# Patient Record
Sex: FEMALE | Race: WHITE | Employment: FULL TIME | URBAN - METROPOLITAN AREA
[De-identification: names, ages, dates, MRNs, and addresses within clinical notes are randomized per-mention and may not be internally consistent; named-entity substitution may affect disease eponyms.]

---

## 2017-02-14 ENCOUNTER — TELEPHONE (OUTPATIENT)
Dept: NURSING | Facility: CLINIC | Age: 57
End: 2017-02-14

## 2017-02-15 NOTE — TELEPHONE ENCOUNTER
Call Type: Triage Call    Presenting Problem: Pt states waking up this morning feeling itchy  and broke out in welts or hives, pt took Benadryl upon onset, that  med helped the itching and repeated med at 6p due to returning  itching and spread of hives.Pt completed course of antibiotic Keflex  on 2/11/17 for urinary tract infection and started topical Premarin  cream on 2/9/17-last dose of this was 2/13/17.  Triage Note:  Guideline Title: Hives  Recommended Disposition: See Provider within 24 hours  Original Inclination: Wanted to speak with a nurse  Override Disposition:  Intended Action: Follow advice given  Physician Contacted: No  Episode of hives not improving within 8 hours of home care AND not previously  evaluated ?  YES  Severe breathing problems ? NO  Breathing problems ? NO  New or worsening signs and symptoms that may indicate shock ? NO  Sudden change in mental status ? NO  Signs/symptoms of anaphylaxis ? NO  First episode of hives with no other symptoms occurring within minutes to several  hours after exposure to an allergen ? NO  New onset of hives after beginning new prescribed, nonprescribed, or  alternative/complementary medication ? NO  History of severe reaction after previous similar exposure to known allergen ? NO  Localized or widespread rash that does not have the appearance of hives (itchy  welts or wheals) ? NO  Physician Instructions:  Care Advice: Call provider if symptoms worsen or new symptoms develop.  Wear loose-fitting, non-restricting clothing.  Cool/tepid showers or baths may help relieve itching.  If cool water alone  does not relieve itching, try adding 1/2 to 1 cup baking soda or colloidal  oatmeal (Aveeno) to bath water.  CAUTIONS  SYMPTOM / CONDITION MANAGEMENT  List, or take, all current prescription(s), nonprescription or alternative  medication(s) to provider for evaluation.  Call  if develop signs and symptoms of anaphylaxis within minutes to  several hours of  exposure: severe difficulty breathing  rapid, weak or irregular pulse  pruritus, urticaria, swelling of face, lips, tongue, or throat causing  tightness or difficulty swallowing  abdominal cramping, nausea, vomiting or diarrhea.  For symptom relief, consider nonprescription antihistamines (such as  Allerest, Allegra, Claritin, Zyrtec, Chlor-Trimetron, Benadryl, etc.) as  directed on label or by pharmacist. Drowsiness may result, especially in  geriatric patients. Non-sedating antihistamines are available without a  prescription.

## 2018-06-17 ENCOUNTER — HOSPITAL ENCOUNTER (EMERGENCY)
Facility: CLINIC | Age: 58
Discharge: HOME | End: 2018-06-17
Attending: EMERGENCY MEDICINE | Admitting: EMERGENCY MEDICINE
Payer: COMMERCIAL

## 2018-06-17 VITALS
HEIGHT: 66 IN | HEART RATE: 81 BPM | TEMPERATURE: 98.7 F | SYSTOLIC BLOOD PRESSURE: 113 MMHG | WEIGHT: 148 LBS | DIASTOLIC BLOOD PRESSURE: 79 MMHG | BODY MASS INDEX: 23.78 KG/M2 | RESPIRATION RATE: 18 BRPM | OXYGEN SATURATION: 98 %

## 2018-06-17 DIAGNOSIS — R05.9 COUGH: ICD-10-CM

## 2018-06-17 DIAGNOSIS — T78.40XA ALLERGIC REACTION, INITIAL ENCOUNTER: ICD-10-CM

## 2018-06-17 PROCEDURE — 25000132 ZZH RX MED GY IP 250 OP 250 PS 637: Performed by: EMERGENCY MEDICINE

## 2018-06-17 PROCEDURE — 99283 EMERGENCY DEPT VISIT LOW MDM: CPT

## 2018-06-17 RX ORDER — EPINEPHRINE 0.3 MG/.3ML
0.3 INJECTION SUBCUTANEOUS
Qty: 0.6 ML | Refills: 0 | Status: SHIPPED | OUTPATIENT
Start: 2018-06-17

## 2018-06-17 RX ORDER — DIPHENHYDRAMINE HCL 25 MG
50 CAPSULE ORAL ONCE
Status: COMPLETED | OUTPATIENT
Start: 2018-06-17 | End: 2018-06-17

## 2018-06-17 RX ADMIN — DIPHENHYDRAMINE HYDROCHLORIDE 50 MG: 25 CAPSULE ORAL at 10:21

## 2018-06-17 ASSESSMENT — ENCOUNTER SYMPTOMS
VOMITING: 0
VOICE CHANGE: 0
FACIAL SWELLING: 0
DIARRHEA: 0
NAUSEA: 0
COUGH: 1

## 2018-06-17 NOTE — ED AVS SNAPSHOT
[facility] Emergency Department    [address] [place] [address]    Phone:  [phone]    Fax:  [phone]                                       Katelin Krueger   MRN: [id number]    Department:  [facility] Emergency Department   Date of Visit:  6/17/2018           Patient Information     Date Of Birth          1960        Your diagnoses for this visit were:     Allergic reaction, initial encounter     Cough        You were seen by Marcelle Quinones MD.      Follow-up Information     Follow up with System, Provider Not In. Schedule an appointment as soon as possible for a visit in 1 day.    Specialty:  Clinic    Why:  for reheck    Contact information:               Discharge Instructions       Please take prednisone and benadryl for allergies and itching.  Return to the ED if worsening swelling, shortness of breath, throat itching/difficulty swallowing, worsening rash or other acute changes.  Watch for these signs.  Give yourself an epi pen if you have shortness of breath, throat tightness, significant lip swelling and call 911.      Discharge Instructions  Allergic Reaction    An allergic reaction can result in a rash, itching, swelling, watery eyes, or a runny nose. A serious reaction can cause swelling of your mouth or throat, or difficulty breathing (wheezing). The most serious allergy is called anaphylaxis, and can be life-threatening. Many allergies result in hives, also called urticaria.       An allergy happens when the body s natural defense system (immune system) overreacts to something. The thing that triggers your allergic reaction is called an allergen. The first time you are exposed to your allergen, you may not have any reaction, but the body makes a protein called an antibody. The antibody lets the body recognize and remember the allergen.  Every time you are exposed to your allergen you get more antibody and your reaction can be more severe.       Generally, every Emergency Department visit should have a follow-up clinic visit with either a primary or a specialty clinic/provider. Please follow-up as instructed by your emergency provider today.    Call 911 if you have:    Swelling of the lips, tongue or throat.    Hoarse voice, drooling or trouble breathing.    Chest pain or shortness of breath.    Fainting or unconsciousness.    What can I do to help myself?    If you know what caused your allergy, do not touch it, throw any of it away, and tell others not to have it around you. Wear a medical alert bracelet with a name of your allergen on it.    If you do not know what you are allergic to, keep a journal of everything that you are exposed to (foods, soaps, medicines, etc.). Take this with you when you follow up with your primary provider or specialist (Allergist). This may help determine what is causing the allergic reaction.    Take any medicines that are prescribed.    Antihistamines can decrease rash or itching. You may use Benadryl  (diphenhydramine) for rash or itching according to package directions, or use a prescription antihistamine as recommended by your provider.    For significant allergic reactions, you may have been given a prescription for an epinephrine (adrenaline) auto injector. Carry this with you at all times! Use it if you are having any symptoms of anaphylaxis.  Do not be afraid to use it. Return to the Emergency Department if you use your auto injector, call 911 if it does not resolve the symptoms. It is only meant to buy time until you can get to the Emergency Department!  If you were given a prescription for medicine here today, be sure to read all of the information (including the package insert) that comes with your prescription.  This will include important information about the medicine, its side effects, and any warnings that you need to know about.  The pharmacist who fills the prescription can provide more information and  answer questions you may have about the medicine.  If you have questions or concerns that the pharmacist cannot address, please call or return to the Emergency Department.   Remember that you can always come back to the Emergency Department if you are not able to see your regular provider in the amount of time listed above, if you get any new symptoms, or if there is anything that worries you.      24 Hour Appointment Hotline       To make an appointment at any [facility], call [phone] ([phone]). If you don't have a family doctor or clinic, we will help you find one. [facility] are conveniently located to serve the needs of you and your family.             Review of your medicines      START taking        Dose / Directions Last dose taken    EPINEPHrine 0.3 MG/0.3ML injection 2-pack   Commonly known as:  EPIPEN/ADRENACLICK/or ANY BX GENERIC EQUIV   Dose:  0.3 mg   Quantity:  0.6 mL        Inject 0.3 mLs (0.3 mg) into the muscle once as needed   Refills:  0                Prescriptions were sent or printed at these locations (1 Prescription)                   Other Prescriptions                Printed at Department/Unit printer (1 of 1)         EPINEPHrine (EPIPEN/ADRENACLICK/OR ANY BX GENERIC EQUIV) 0.3 MG/0.3ML injection 2-pack                Orders Needing Specimen Collection     None      Pending Results     No orders found from 6/15/2018 to 6/18/2018.            Pending Culture Results     No orders found from 6/15/2018 to 6/18/2018.            Pending Results Instructions     If you had any lab results that were not finalized at the time of your Discharge, you can call the ED Lab Result RN at [phone]. You will be contacted by this team for any positive Lab results or changes in treatment. The nurses are available 7 days a week from 10A to 6:30P.  You can leave a message 24 hours per day and they will return your call.        Test Results From Your Hospital Stay                Clinical Quality Measure: Blood Pressure Screening     Your blood pressure was checked while you were in the emergency department today. The last reading we obtained was  BP: 113/79 . Please read the guidelines below about what these numbers mean and what you should do about them.  If your systolic blood pressure (the top number) is less than 120 and your diastolic blood pressure (the bottom number) is less than 80, then your blood pressure is normal. There is nothing more that you need to do about it.  If your systolic blood pressure (the top number) is 120-139 or your diastolic blood pressure (the bottom number) is 80-89, your blood pressure may be higher than it should be. You should have your blood pressure rechecked within a year by a primary care provider.  If your systolic blood pressure (the top number) is 140 or greater or your diastolic blood pressure (the bottom number) is 90 or greater, you may have high blood pressure. High blood pressure is treatable, but if left untreated over time it can put you at risk for heart attack, stroke, or kidney failure. You should have your blood pressure rechecked by a primary care provider within the next 4 weeks.  If your provider in the emergency department today gave you specific instructions to follow-up with your doctor or provider even sooner than that, you should follow that instruction and not wait for up to 4 weeks for your follow-up visit.        Thank you for choosing [facility]       Thank you for choosing [facility] for your care. Our goal is always to provide you with excellent care. Hearing back from our patients is one way we can continue to improve our services. Please take a few minutes to complete the written survey that you may receive in the mail after you visit with us. Thank you!        [facility] Information     [facility] lets you send messages to your doctor, view your test results, renew your prescriptions, schedule appointments and more. To sign up,  "go to [email or web address] . Click on \"Log in\" on the left side of the screen, which will take you to the Welcome page. Then click on \"Sign up Now\" on the right side of the page.     You will be asked to enter the access code listed below, as well as some personal information. Please follow the directions to create your username and password.     Your access code is: [email or web address]  Expires: 9/15/2018 11:12 AM     Your access code will  in 90 days. If you need help or a new code, please call your [facility] clinic or [phone].        Care EveryWhere ID     This is your Care EveryWhere ID. This could be used by other organizations to access your [facility] medical records  [id number]        Equal Access to Services     CRESCENCIO MORAN : Jacy Prescott, tresa diego, tyrone ogalgenevieve vega, capri bryan . So [facility] [phone].    ATENCIÓN: Si habla español, tiene a garcia disposición servicios gratuitos de asistencia lingüística. Maurilio al [phone].    We comply with applicable federal civil rights laws and [place] laws. We do not discriminate on the basis of race, color, national origin, age, disability, sex, sexual orientation, or gender identity.            After Visit Summary       This is your record. Keep this with you and show to your community pharmacist(s) and doctor(s) at your next visit.                  "

## 2018-06-17 NOTE — DISCHARGE INSTRUCTIONS
Please take prednisone and benadryl for allergies and itching.  Return to the ED if worsening swelling, shortness of breath, throat itching/difficulty swallowing, worsening rash or other acute changes.  Watch for these signs.  Give yourself an epi pen if you have shortness of breath, throat tightness, significant lip swelling and call 911.      Discharge Instructions  Allergic Reaction    An allergic reaction can result in a rash, itching, swelling, watery eyes, or a runny nose. A serious reaction can cause swelling of your mouth or throat, or difficulty breathing (wheezing). The most serious allergy is called anaphylaxis, and can be life-threatening. Many allergies result in hives, also called urticaria.       An allergy happens when the body s natural defense system (immune system) overreacts to something. The thing that triggers your allergic reaction is called an allergen. The first time you are exposed to your allergen, you may not have any reaction, but the body makes a protein called an antibody. The antibody lets the body recognize and remember the allergen.  Every time you are exposed to your allergen you get more antibody and your reaction can be more severe.      Generally, every Emergency Department visit should have a follow-up clinic visit with either a primary or a specialty clinic/provider. Please follow-up as instructed by your emergency provider today.    Call 911 if you have:    Swelling of the lips, tongue or throat.    Hoarse voice, drooling or trouble breathing.    Chest pain or shortness of breath.    Fainting or unconsciousness.    What can I do to help myself?    If you know what caused your allergy, do not touch it, throw any of it away, and tell others not to have it around you. Wear a medical alert bracelet with a name of your allergen on it.    If you do not know what you are allergic to, keep a journal of everything that you are exposed to (foods, soaps, medicines, etc.). Take this with  you when you follow up with your primary provider or specialist (Allergist). This may help determine what is causing the allergic reaction.    Take any medicines that are prescribed.    Antihistamines can decrease rash or itching. You may use Benadryl  (diphenhydramine) for rash or itching according to package directions, or use a prescription antihistamine as recommended by your provider.    For significant allergic reactions, you may have been given a prescription for an epinephrine (adrenaline) auto injector. Carry this with you at all times! Use it if you are having any symptoms of anaphylaxis.  Do not be afraid to use it. Return to the Emergency Department if you use your auto injector, call 911 if it does not resolve the symptoms. It is only meant to buy time until you can get to the Emergency Department!  If you were given a prescription for medicine here today, be sure to read all of the information (including the package insert) that comes with your prescription.  This will include important information about the medicine, its side effects, and any warnings that you need to know about.  The pharmacist who fills the prescription can provide more information and answer questions you may have about the medicine.  If you have questions or concerns that the pharmacist cannot address, please call or return to the Emergency Department.   Remember that you can always come back to the Emergency Department if you are not able to see your regular provider in the amount of time listed above, if you get any new symptoms, or if there is anything that worries you.

## 2018-06-17 NOTE — ED AVS SNAPSHOT
[facility] Emergency Department    [address]    [facility] [address]    Phone:  [phone]    Fax:  [phone]                                       Katelin Krueger   MRN: [id number]    Department:  [facility] Emergency Department   Date of Visit:  6/17/2018           After Visit Summary Signature Page     I have received my discharge instructions, and my questions have been answered. I have discussed any challenges I see with this plan with the nurse or doctor.    ..........................................................................................................................................  Patient/Patient Representative Signature      ..........................................................................................................................................  Patient Representative Print Name and Relationship to Patient    ..................................................               ................................................  Date                                            Time    ..........................................................................................................................................  Reviewed by Signature/Title    ...................................................              ..............................................  Date                                                            Time

## 2018-06-17 NOTE — ED TRIAGE NOTES
"Treated for UTI with bactrim, had an allergic reaction. Changed antibiotic on Friday. Got prednisone from PMD to take in allergy symptoms continue.  Today pt c/o \"tingling in tongue and raw feeling under tongue\"  "

## 2018-06-17 NOTE — ED PROVIDER NOTES
"  History     Chief Complaint:  Allergic Reaction    The history is provided by the patient.      Katelin Krueger is a 57 year old female who presents with her  for evaluation of an allergic reaction. The patient reports that she was evaluated in clinic 3 days ago for a UTI, prescribed Bactrim which she has taken before with no reaction, and discharged home. She took her first dose of Bactrim that day. Two days ago, the patient noticed tongue and throat swelling when she woke up, prompting her to take Benadryl which alleviated her symptoms before again seeking evaluation in clinic. Patient was sent home with Prednisone and Zyrtec at that point. Her most recent dose of benadryl was that night (2 nights ago).  Her antibiotics were changed to cefuroxime which she has tolerated before.      This morning, the patient endorses noticing a \"raw feeling\" under he tongue along with mouth tingling and coughing; no mouth swelling at this time. Her tongue is described as \"the feeling after you burn your tongue on hot coffee\". Around 9am while walking into [employer], the patient additionally noticed a \"funny feeling\" in her throat described as a tickle. Patient was advised to present to the ED if she develops a cough, prompting her evaluation today. Patient has taken 4 doses of cefuroxime prior to presentation. Patient does not have an EpiPen at home. Patient denies noticing any ulcers or rashes, any recent food, clothing, or detergent changes, nausea, vomiting, diarrhea, voice changes, drooling, or other complaint.     Allergies:  Asa [Aspirin]  Bactrim [Sulfamethoxazole W/Trimethoprim]  Ibuprofen  Nitrofurantoin  Quinolones      Medications:    Metoprolol  Multivitamin    Past Medical History:    Allergic reaction    Past Surgical History:    History reviewed. No pertinent surgical history.     Family History:    History reviewed. No pertinent family history.      Social History:  Presents with spouse   Tobacco use: not on " "file  Alcohol use: not on file  PCP: Arlyn Page    Marital Status:       Review of Systems   HENT: Negative for drooling, facial swelling and voice change.    Respiratory: Positive for cough.    Gastrointestinal: Negative for diarrhea, nausea and vomiting.   Skin: Negative for rash.   All other systems reviewed and are negative.    Physical Exam     Patient Vitals for the past 24 hrs:   BP Temp Temp src Pulse Resp SpO2 Height Weight   06/17/18 1045 - - - - - 98 % - -   06/17/18 1021 113/79 98.7  F (37.1  C) Oral 81 18 96 % 1.664 m (5' 5.5\") 67.1 kg (148 lb)   06/17/18 1015 - - - - - 94 % - -        Physical Exam  Physical Exam   General: Resting on the bed.  Head: No obvious trauma to head.  Ears, Nose, Throat:  External ears normal.  Nose normal.  No pharyngeal erythema, swelling or exudate.  Midline uvula.  no swelling or lesions.    Eyes:  Conjunctivae clear.  Pupils are equal, round, and reactive.   Neck: Normal range of motion.  Neck supple.   CV: Regular rate and rhythm.  No murmurs.      Respiratory: Effort normal and breath sounds normal.  No wheezing or crackles.   Gastrointestinal: Soft.  No distension. There is no tenderness.   Neuro: Alert. Moving all extremities appropriately.  Normal speech.    Skin: Skin is warm and dry.  No rash or hives noted. No blisters.      Emergency Department Course     Interventions:  1021: Benadryl 50 mg IV      Emergency Department Course:  Past medical records, nursing notes, and vitals reviewed.  1010: I performed an exam of the patient and obtained history, as documented above.    Above interventions provided.    1111: I rechecked the patient. Patient is feeling much better. Findings and plan explained to the Patient and spouse. Patient discharged home with instructions regarding supportive care, medications, and reasons to return. The importance of close follow-up was reviewed.       Impression & Plan      Medical Decision Making:  Katelin Krueger is a 57 " year old female with several allergic reactions to medication presents with concern for allergic reaction.  Vital signs are unremarkable.  Broad differential pursued, including anaphylaxis, angioedema, localized allergic reaction, drug reaction, Rober Josep, etc.  Overall, the patient has very minimal symptoms and some vague tingling in her tongue and a tickle in the back of her throat.  She reports some coughing.  She is given return precautions and if she starts to cough and then to start prednisone.  This morning she was concerned about this and presented to the ER.  She did take 20 mg of prednisone in addition to 10 mg of Zyrtec.  She has no changes in voice, no muffled voice, no swelling of the lips or tongue, no significant angioedema.  She has clear lung sounds to auscultation, no evidence of wheezing.  No nausea or vomiting.  No rash.  At this point she does not meet anaphylaxis criteria.  There is no rash to suggest drug eruption, Kim-Josep or other similar dangerous medication induced reactions.  She is given 50 mg of Benadryl and observed for a period of time.  She felt improved.  Advised that she continue prednisone and Zyrtec.  She will take Benadryl as needed.  Advised that she see her primary care doctor tomorrow for a recheck.  I suspect this is likely a delayed reaction or a continued reaction from the Bactrim, but did discuss with patient that it could be secondary to the cefuroxime.  She has tolerated this medication in the past without issues.  This seems to be less likely, but is still possible.  Given that she has UTI that they are attempting to treat, I advised that she continue this antibiotic unless she has a further reaction to it.  She has had numerous medical reactions therefore I would like to avoid changing her medication in case there is a subsequent reaction.  Patient agrees to this plan.  She was discharged with an EpiPen as needed for severe reactions.  She voiced  understanding of the plan, and was discharged in stable condition.    Diagnosis:    ICD-10-CM   1. Allergic reaction, initial encounter T78.40XA   2. Cough R05       Disposition:  Discharged to home with plan as outlined.     Discharge Medications:  Discharge Medication List as of 6/17/2018 11:12 AM      START taking these medications    Details   EPINEPHrine (EPIPEN/ADRENACLICK/OR ANY BX GENERIC EQUIV) 0.3 MG/0.3ML injection 2-pack Inject 0.3 mLs (0.3 mg) into the muscle once as needed, Disp-0.6 mL, R-0, Local Print               I, Marc Francois am serving as a scribe at 10:02 AM on 6/17/2018 to document services personally performed by Marcelle Quinones MD based on my observations and the provider's statements to me.  6/17/2018   [facility] EMERGENCY DEPARTMENT     Marcelle Quinones MD  06/18/18 0350